# Patient Record
Sex: MALE | Race: AMERICAN INDIAN OR ALASKA NATIVE | NOT HISPANIC OR LATINO | URBAN - METROPOLITAN AREA
[De-identification: names, ages, dates, MRNs, and addresses within clinical notes are randomized per-mention and may not be internally consistent; named-entity substitution may affect disease eponyms.]

---

## 2018-02-02 ENCOUNTER — EMERGENCY (EMERGENCY)
Facility: HOSPITAL | Age: 33
LOS: 1 days | Discharge: HOME | End: 2018-02-02
Attending: EMERGENCY MEDICINE
Payer: SELF-PAY

## 2018-02-02 VITALS
SYSTOLIC BLOOD PRESSURE: 129 MMHG | HEART RATE: 87 BPM | RESPIRATION RATE: 15 BRPM | HEIGHT: 70 IN | DIASTOLIC BLOOD PRESSURE: 85 MMHG | WEIGHT: 184.97 LBS | OXYGEN SATURATION: 98 % | TEMPERATURE: 98 F

## 2018-02-02 PROCEDURE — 99053 MED SERV 10PM-8AM 24 HR FAC: CPT

## 2018-02-02 PROCEDURE — 99283 EMERGENCY DEPT VISIT LOW MDM: CPT | Mod: 25

## 2018-02-02 NOTE — ED ADULT TRIAGE NOTE - CHIEF COMPLAINT QUOTE
Right wrist pain after wearing a hand cuffs. As per patient he was released by [facility] 2 hours ago.

## 2018-02-03 PROCEDURE — 99283 EMERGENCY DEPT VISIT LOW MDM: CPT | Mod: 25

## 2018-02-03 PROCEDURE — 73110 X-RAY EXAM OF WRIST: CPT | Mod: 26,RT

## 2018-02-03 PROCEDURE — 73110 X-RAY EXAM OF WRIST: CPT

## 2018-02-03 RX ORDER — IBUPROFEN 200 MG
800 TABLET ORAL ONCE
Qty: 0 | Refills: 0 | Status: COMPLETED | OUTPATIENT
Start: 2018-02-03 | End: 2018-02-03

## 2018-02-03 RX ORDER — IBUPROFEN 200 MG
1 TABLET ORAL
Qty: 40 | Refills: 0 | OUTPATIENT
Start: 2018-02-03 | End: 2018-02-12

## 2018-02-03 NOTE — ED ADULT NURSE NOTE - CHIEF COMPLAINT QUOTE
Right wrist pain after wearing a hand cuffs. As per patient he was released by [facility] 2 hours ago.

## 2018-02-03 NOTE — ED PROVIDER NOTE - OBJECTIVE STATEMENT
33 y/o M pt with no PMHx and no PSHx presents to ED c/o R wrist pain since yesterday. Pt reports that earlier yesterday, he was arrested and handcuffs were placed on his b/l wrists; pt notes the handcuff was too tight on his R wrist, and since being released by the police officers, pt has been having shooting pains from the R wrist up to his R elbow and down to his R fingers. Pt also reports associated intermittent R hand numbness. Pt denies R hand weakness or any other complaints. Pt also denies recent trauma, or having taken any medications for pain relief. NKDA.

## 2018-02-03 NOTE — ED PROVIDER NOTE - UPPER EXTREMITY EXAM, RIGHT
mild small abrasion over the radial aspect of the volar R wrist with 2+ radial pulse, normal capillary refill, and normal sensory and motor function of the R hand; no deformity of R wrist

## 2018-02-03 NOTE — ED PROVIDER NOTE - MEDICAL DECISION MAKING DETAILS
patient presents with R wrist pain after handcuffed by police. XR neg for fracture, likely contusion vs nerve irritation. Given ibuprofen. patient advised to followup with hand specialist if pain does not improve.

## 2018-02-03 NOTE — ED ADULT NURSE NOTE - OBJECTIVE STATEMENT
Patient presents to ED with c/o right wrist pain. Patient is A&Ox3, w/o bleeding or serious injury, in no acute distress.

## 2018-05-22 NOTE — ED PROVIDER NOTE - PSH
Assessment and Plan


Assessment and plan: 


Patient is 53 yo woman with a history of left breast cancer on chemotherapy and 

hypertension who pw n/v/d which has resolved. Also, pt has history of travel to 

[facility] 1 month ago. She was admitted for rhabdo, cpk [id number]. D/w Dr. To from [place]. 





-Acute viral AGE: treat symptomatically


-Rhabdomyolysis: serial cpk, which i ordered today, slightly improved


-Breast cancer: continue home regimen


-DVT prophylaxis: sq lovenox


full code


disposition: d/c once cpk levels under 1000








History


Interval history: 


Patient was seen and examined. Follow-up on current diagnosis. Overnight 

uneventful. Patient denies any chest pain, shortness breath, nausea/vomiting or 

severe headaches. Imaging, nursing note, chart, labs and old chart reviewed. 

Discussed with patient. 








Hospitalist Physical





- Physical exam


Narrative exam: 


GEN: WDWN, NAD, Awake, Alert, Orientated 


HEENT: NCAT, EOMI, PERRL, OP Clear 


NECK: supple, no adenopathy, no thyromegaly, no JVD 


CVS/HEART: RRR, normal S1S2, pulses present bilaterally 


CHEST/LUNGS: CTA B, Symmetrical chest expansion, good air entry bilaterally 


GI/Abdomen: soft, NTND, good bowel sounds, no guarding or rebound 


/Bladder: no suprapubic tenderness, no CVA or paraspinal tenderness 


EXT/Skin: no c/c/e, no obvious rash 


MSK: FROM x 4 


Neuro: CN 2-12 grossly intact, no new focal deficits 


Psych: calm








- Constitutional


Vitals: 


 











Temp Pulse Resp BP Pulse Ox


 


 98.0 F   51 L  18   143/85   100 


 


 05/22/18 07:58  05/22/18 07:58  05/22/18 07:58  05/22/18 07:58  05/22/18 10:05














Results





- Labs


CBC & Chem 7: 


 05/21/18 17:44





 05/21/18 17:44


Labs: 


 Laboratory Last Values











WBC  7.9 K/mm3 (4.5-11.0)   05/21/18  17:44    


 


RBC  4.53 M/mm3 (3.65-5.03)   05/21/18  17:44    


 


Hgb  12.6 gm/dl (10.1-14.3)   05/21/18  17:44    


 


Hct  38.6 % (30.3-42.9)   05/21/18  17:44    


 


MCV  85 fl (79-97)   05/21/18  17:44    


 


MCH  28 pg (28-32)   05/21/18  17:44    


 


MCHC  33 % (30-34)   05/21/18  17:44    


 


RDW  16.5 % (13.2-15.2)  H  05/21/18  17:44    


 


Plt Count  138 K/mm3 (140-440)  L  05/21/18  17:44    


 


Lymph % (Auto)  20.3 % (13.4-35.0)   05/21/18  17:44    


 


Mono % (Auto)  5.3 % (0.0-7.3)   05/21/18  17:44    


 


Eos % (Auto)  0.1 % (0.0-4.3)   05/21/18  17:44    


 


Baso % (Auto)  0.4 % (0.0-1.8)   05/21/18  17:44    


 


Lymph #  1.6 K/mm3 (1.2-5.4)   05/21/18  17:44    


 


Mono #  0.4 K/mm3 (0.0-0.8)   05/21/18  17:44    


 


Eos #  0.0 K/mm3 (0.0-0.4)   05/21/18  17:44    


 


Baso #  0.0 K/mm3 (0.0-0.1)   05/21/18  17:44    


 


Seg Neutrophils %  73.9 % (40.0-70.0)  H  05/21/18  17:44    


 


Seg Neutrophils #  5.9 K/mm3 (1.8-7.7)   05/21/18  17:44    


 


Sodium  139 mmol/L (137-145)   05/21/18  17:44    


 


Potassium  3.7 mmol/L (3.6-5.0)   05/21/18  17:44    


 


Chloride  103.6 mmol/L ()   05/21/18  17:44    


 


Carbon Dioxide  21 mmol/L (22-30)  L  05/21/18  17:44    


 


Anion Gap  18 mmol/L  05/21/18  17:44    


 


BUN  11 mg/dL (7-17)   05/21/18  17:44    


 


Creatinine  0.8 mg/dL (0.7-1.2)   05/21/18  17:44    


 


Estimated GFR  > 60 ml/min  05/21/18  17:44    


 


BUN/Creatinine Ratio  14 %  05/21/18  17:44    


 


Glucose  110 mg/dL ()  H  05/21/18  17:44    


 


Calcium  8.8 mg/dL (8.4-10.2)   05/21/18  17:44    


 


Total Bilirubin  0.20 mg/dL (0.1-1.2)   05/21/18  17:44    


 


Direct Bilirubin  < 0.2 mg/dL (0-0.2)   05/21/18  17:44    


 


Indirect Bilirubin  0.0 mg/dL  05/21/18  17:44    


 


AST  39 units/L (5-40)   05/21/18  17:44    


 


ALT  22 units/L (7-56)   05/21/18  17:44    


 


Alkaline Phosphatase  91 units/L ()   05/21/18  17:44    


 


Total Creatine Kinase  1419 units/L ()  H  05/22/18  11:20    


 


CK-MB (CK-2)  1.9 ng/mL (0.0-4.0)   05/22/18  11:20    


 


CK-MB (CK-2) Rel Index  0.1  (0-4)   05/22/18  11:20    


 


Troponin T  < 0.010 ng/mL (0.00-0.029)   05/22/18  11:20    


 


Total Protein  7.5 g/dL (6.3-8.2)   05/21/18  17:44    


 


Albumin  3.7 g/dL (3.9-5)  L  05/21/18  17:44    


 


Albumin/Globulin Ratio  1.0 %  05/21/18  17:44    


 


Lipase  13 units/L (13-60)   05/21/18  17:44    


 


HCG, Qual  Negative  (Negative)   05/21/18  19:21    


 


Urine Color  Straw  (Yellow)   05/21/18  18:54    


 


Urine Turbidity  Clear  (Clear)   05/21/18  18:54    


 


Urine pH  7.0  (5.0-7.0)   05/21/18  18:54    


 


Ur Specific Gravity  1.004  (1.003-1.030)   05/21/18  18:54    


 


Urine Protein  <15 mg/dl mg/dL (Negative)   05/21/18  18:54    


 


Urine Glucose (UA)  Neg mg/dL (Negative)   05/21/18  18:54    


 


Urine Ketones  Neg mg/dL (Negative)   05/21/18  18:54    


 


Urine Blood  Sm  (Negative)   05/21/18  18:54    


 


Urine Nitrite  Neg  (Negative)   05/21/18  18:54    


 


Urine Bilirubin  Neg  (Negative)   05/21/18  18:54    


 


Urine Urobilinogen  < 2.0 mg/dL (<2.0)   05/21/18  18:54    


 


Ur Leukocyte Esterase  Neg  (Negative)   05/21/18  18:54    


 


Urine WBC (Auto)  < 1.0 /HPF (0.0-6.0)   05/21/18  18:54    


 


Urine RBC (Auto)  1.0 /HPF (0.0-6.0)   05/21/18  18:54    


 


Urine Bacteria (Auto)  1+ /HPF (Negative)   05/21/18  18:54
No significant past surgical history

## 2019-03-11 NOTE — ED ADULT NURSE NOTE - NS ED NOTE ABUSE SUSPICION NEGLECT YN
Product: Ventolin HFA  Lot Number: [id number]  MFG: JESSE  NDC: 0173--0682-24  Expiration: 10/2019  Time Given: 3:56 PM  Dose Given: 4 Puffs  Administered by Agnieszka Riley RN   Site Administered: Inhalation  Clinic stock dispensed.       No